# Patient Record
Sex: FEMALE | Race: WHITE | NOT HISPANIC OR LATINO | Employment: UNEMPLOYED | URBAN - NONMETROPOLITAN AREA
[De-identification: names, ages, dates, MRNs, and addresses within clinical notes are randomized per-mention and may not be internally consistent; named-entity substitution may affect disease eponyms.]

---

## 2019-01-20 ENCOUNTER — WALK IN (OUTPATIENT)
Dept: URGENT CARE | Age: 24
End: 2019-01-20

## 2019-01-20 VITALS
WEIGHT: 176 LBS | OXYGEN SATURATION: 100 % | HEART RATE: 96 BPM | BODY MASS INDEX: 31.18 KG/M2 | HEIGHT: 63 IN | RESPIRATION RATE: 16 BRPM | TEMPERATURE: 99.1 F | DIASTOLIC BLOOD PRESSURE: 84 MMHG | SYSTOLIC BLOOD PRESSURE: 120 MMHG

## 2019-01-20 DIAGNOSIS — R06.02 SOB (SHORTNESS OF BREATH): ICD-10-CM

## 2019-01-20 DIAGNOSIS — J06.9 VIRAL URI: Primary | ICD-10-CM

## 2019-01-20 LAB
% PREDICTED PEAK FLOW: NORMAL % (ref 80–150)
ALBUTEROL DOSE: NORMAL INHALATIONS
AZMACORT DOSE: NORMAL INHALATIONS
FLOVENT DOSE: NORMAL INHALATIONS
O2 SATURATION,RESTING: NORMAL % (ref 95–100)
PEAK EXPIRATORY FLOW: 380 L/MIN (ref 30–900)
PULMICORT DOSE: NORMAL INHALATIONS
SINGULAIR DOSE: NORMAL INHALATIONS

## 2019-01-20 PROCEDURE — 94640 AIRWAY INHALATION TREATMENT: CPT | Performed by: FAMILY MEDICINE

## 2019-01-20 PROCEDURE — 99202 OFFICE O/P NEW SF 15 MIN: CPT | Performed by: FAMILY MEDICINE

## 2019-01-20 RX ORDER — ALBUTEROL SULFATE 2.5 MG/3ML
2.5 SOLUTION RESPIRATORY (INHALATION) ONCE
Status: COMPLETED | OUTPATIENT
Start: 2019-01-20 | End: 2019-01-20

## 2019-01-20 RX ADMIN — ALBUTEROL SULFATE 2.5 MG: 2.5 SOLUTION RESPIRATORY (INHALATION) at 13:48

## 2019-01-20 ASSESSMENT — ENCOUNTER SYMPTOMS
WHEEZING: 0
DIZZINESS: 0
LIGHT-HEADEDNESS: 0
SINUS PRESSURE: 0
CHILLS: 0
TROUBLE SWALLOWING: 0
COUGH: 0
CHOKING: 0
SORE THROAT: 1
FEVER: 0
RHINORRHEA: 0
FATIGUE: 0
HEADACHES: 0
ABDOMINAL PAIN: 0
DIAPHORESIS: 0
CHEST TIGHTNESS: 0
SHORTNESS OF BREATH: 1
EYE REDNESS: 0
APPETITE CHANGE: 0
STRIDOR: 0
VOMITING: 0
EYE DISCHARGE: 0
DIARRHEA: 0

## 2019-02-25 ENCOUNTER — WALK IN (OUTPATIENT)
Dept: URGENT CARE | Age: 24
End: 2019-02-25

## 2019-02-25 VITALS
SYSTOLIC BLOOD PRESSURE: 134 MMHG | BODY MASS INDEX: 31.46 KG/M2 | DIASTOLIC BLOOD PRESSURE: 86 MMHG | RESPIRATION RATE: 18 BRPM | TEMPERATURE: 100.2 F | OXYGEN SATURATION: 97 % | HEART RATE: 106 BPM | WEIGHT: 177.6 LBS

## 2019-02-25 DIAGNOSIS — R05.9 COUGH: Primary | ICD-10-CM

## 2019-02-25 DIAGNOSIS — J10.1 INFLUENZA A: ICD-10-CM

## 2019-02-25 DIAGNOSIS — R50.9 FEVER AND CHILLS: ICD-10-CM

## 2019-02-25 DIAGNOSIS — J98.01 BRONCHOSPASM, ACUTE: ICD-10-CM

## 2019-02-25 LAB
FLUAV AG SPEC QL IA: POSITIVE
FLUBV AG SPEC QL IF: ABNORMAL

## 2019-02-25 PROCEDURE — 99214 OFFICE O/P EST MOD 30 MIN: CPT | Performed by: FAMILY MEDICINE

## 2019-02-25 PROCEDURE — 87804 INFLUENZA ASSAY W/OPTIC: CPT | Performed by: FAMILY MEDICINE

## 2019-02-25 RX ORDER — ALBUTEROL SULFATE 90 UG/1
2 AEROSOL, METERED RESPIRATORY (INHALATION) EVERY 4 HOURS PRN
Qty: 1 INHALER | Refills: 0 | Status: SHIPPED | OUTPATIENT
Start: 2019-02-25

## 2019-02-25 RX ORDER — OSELTAMIVIR PHOSPHATE 75 MG/1
75 CAPSULE ORAL 2 TIMES DAILY
Qty: 10 CAPSULE | Refills: 0 | Status: SHIPPED | OUTPATIENT
Start: 2019-02-25 | End: 2019-03-02

## 2019-02-25 RX ORDER — ACETAMINOPHEN 325 MG/1
650 TABLET ORAL ONCE
Status: COMPLETED | OUTPATIENT
Start: 2019-02-25 | End: 2019-02-25

## 2019-02-25 RX ADMIN — ACETAMINOPHEN 650 MG: 325 TABLET ORAL at 20:12

## 2019-02-26 ENCOUNTER — TELEPHONE (OUTPATIENT)
Dept: URGENT CARE | Age: 24
End: 2019-02-26

## 2019-02-26 RX ORDER — ONDANSETRON 4 MG/1
4 TABLET, ORALLY DISINTEGRATING ORAL EVERY 8 HOURS PRN
Qty: 10 TABLET | Refills: 0 | Status: SHIPPED | OUTPATIENT
Start: 2019-02-26 | End: 2019-03-08

## 2019-02-27 ENCOUNTER — TELEPHONE (OUTPATIENT)
Dept: URGENT CARE | Age: 24
End: 2019-02-27

## 2019-02-27 ENCOUNTER — WALK IN (OUTPATIENT)
Dept: URGENT CARE | Age: 24
End: 2019-02-27

## 2019-02-27 DIAGNOSIS — J10.1 INFLUENZA A: ICD-10-CM

## 2019-02-27 DIAGNOSIS — J45.41 MODERATE PERSISTENT REACTIVE AIRWAY DISEASE WITH WHEEZING WITH ACUTE EXACERBATION: Primary | ICD-10-CM

## 2019-02-27 PROCEDURE — 99213 OFFICE O/P EST LOW 20 MIN: CPT | Performed by: FAMILY MEDICINE

## 2019-02-27 RX ORDER — PREDNISONE 20 MG/1
20 TABLET ORAL DAILY
Qty: 5 TABLET | Refills: 0 | Status: SHIPPED | OUTPATIENT
Start: 2019-02-27

## 2019-02-27 ASSESSMENT — PAIN SCALES - GENERAL: PAINLEVEL: 0

## 2019-03-28 ENCOUNTER — TELEPHONE (OUTPATIENT)
Dept: SCHEDULING | Age: 24
End: 2019-03-28

## 2021-05-25 VITALS
OXYGEN SATURATION: 99 % | HEART RATE: 92 BPM | TEMPERATURE: 98.3 F | SYSTOLIC BLOOD PRESSURE: 108 MMHG | RESPIRATION RATE: 16 BRPM | DIASTOLIC BLOOD PRESSURE: 64 MMHG

## 2022-12-27 ENCOUNTER — APPOINTMENT (RX ONLY)
Dept: URBAN - METROPOLITAN AREA CLINIC 334 | Facility: CLINIC | Age: 27
Setting detail: DERMATOLOGY
End: 2022-12-27

## 2022-12-27 DIAGNOSIS — Z12.83 ENCOUNTER FOR SCREENING FOR MALIGNANT NEOPLASM OF SKIN: ICD-10-CM

## 2022-12-27 DIAGNOSIS — D22 MELANOCYTIC NEVI: ICD-10-CM

## 2022-12-27 DIAGNOSIS — L71.8 OTHER ROSACEA: ICD-10-CM | Status: INADEQUATELY CONTROLLED

## 2022-12-27 DIAGNOSIS — L81.4 OTHER MELANIN HYPERPIGMENTATION: ICD-10-CM

## 2022-12-27 DIAGNOSIS — D18.0 HEMANGIOMA: ICD-10-CM

## 2022-12-27 PROBLEM — D18.01 HEMANGIOMA OF SKIN AND SUBCUTANEOUS TISSUE: Status: ACTIVE | Noted: 2022-12-27

## 2022-12-27 PROBLEM — D48.5 NEOPLASM OF UNCERTAIN BEHAVIOR OF SKIN: Status: ACTIVE | Noted: 2022-12-27

## 2022-12-27 PROCEDURE — ? TREATMENT REGIMEN

## 2022-12-27 PROCEDURE — 11300 SHAVE SKIN LESION 0.5 CM/<: CPT

## 2022-12-27 PROCEDURE — ? SHAVE REMOVAL

## 2022-12-27 PROCEDURE — ? FULL BODY SKIN EXAM

## 2022-12-27 PROCEDURE — ? COUNSELING

## 2022-12-27 PROCEDURE — ? IN-HOUSE DISPENSING PHARMACY

## 2022-12-27 PROCEDURE — ? ADDITIONAL NOTES

## 2022-12-27 PROCEDURE — 99204 OFFICE O/P NEW MOD 45 MIN: CPT | Mod: 25

## 2022-12-27 ASSESSMENT — LOCATION DETAILED DESCRIPTION DERM
LOCATION DETAILED: PERIUMBILICAL SKIN
LOCATION DETAILED: UPPER STERNUM
LOCATION DETAILED: LEFT INFERIOR MEDIAL MALAR CHEEK
LOCATION DETAILED: RIGHT INFERIOR MEDIAL LOWER BACK

## 2022-12-27 ASSESSMENT — LOCATION SIMPLE DESCRIPTION DERM
LOCATION SIMPLE: LEFT CHEEK
LOCATION SIMPLE: RIGHT LOWER BACK
LOCATION SIMPLE: CHEST
LOCATION SIMPLE: ABDOMEN

## 2022-12-27 ASSESSMENT — LOCATION ZONE DERM
LOCATION ZONE: TRUNK
LOCATION ZONE: FACE

## 2022-12-27 NOTE — PROCEDURE: IN-HOUSE DISPENSING PHARMACY
Product 35 Units Dispensed: 0
Product 8 Unit Type: bottle(s)
Name Of Product 16: Dermatitis Topical Solution (Clobetasol) #16
Product 6 Refills: 2
Product 30 Unit Type: mg
Product 11 Price/Unit (In Dollars): 20
Name Of Product 9: Dermatitis Cream $9 (Clobetasol)
Product 14 Choe/Unit (In Dollars): 45
Product 6 Amount/Unit (Numbers Only): 1
Product 2 Price/Unit (In Dollars): 55
Product 16 Application Directions: mix entire bottle into a large jar of Cerave Cream OTC and apply to affected area bid
Detail Level: Zone
Product 4 Application Directions: apply to face and body at night
Product 2 Refills: 3
Send Charges To Patient Encounter: Yes
Name Of Product 5: Acne Gel $4 (adapalene/bpo)
Product 9 Application Directions: apply to affected area twice a day for two weeks on and two weeks off
Name Of Product 17: Xerosis Gel $17
Name Of Product 3: Acne Moisturizing Cream #2
Product 7 Application Directions: apply 2-3 drops to scalp 3 times per week
Name Of Product 15: Antifungal Shampoo $15
Product 5 Refills: 4
Product 10 Price/Unit (In Dollars): 35
Name Of Product 8: Antifungal Cream #8 (Econazole)
Product 12 Application Directions: apply to face nightly for two months then two months off
Product 17 Amount/Unit (Numbers Only): 120
Product 15 Application Directions: apply to scalp 3 x per week
Product 3 Application Directions: apply to face at night
Name Of Product 4: Acne Gel Combo #3 (BPO/Clinda)
Product 8 Application Directions: apply to affected area twice a day
Name Of Product 11: Antibacterial Ointment # 11
Product 4 Price/Unit (In Dollars): 40
Name Of Product 2: Acne Gel #1
Name Of Product 14: Rosacea Silicone Gel #14
Product 6 Application Directions: apply to affected area 3x per week
Name Of Product 7: #6 Alopecia  Topical Solution
Product 11 Application Directions: apply to affected area bid for 10 days
Product 14 Application Directions: apply to face daily
Name Of Product 12: Melasma Emulsion
Product 12 Price/Unit (In Dollars): 60
Name Of Product 10: Fungal Dermatitis Cream (hydro/Keto)
Product 15 Price/Unit (In Dollars): 30
Product 3 Price/Unit (In Dollars): 50
Name Of Product 1: Nail Fungal Solution
Product 17 Application Directions: apply to affected area daily
Name Of Product 13: Rosacea Cream (azeleic acid) #13
Product 17 Unit Type: ml
Name Of Product 6: AK gel #5
Product 1 Application Directions: apply to nails twice a day
Product 10 Application Directions: apply to affected area 3 times per week
Product 13 Application Directions: apply to affected bid

## 2022-12-27 NOTE — PROCEDURE: SHAVE REMOVAL
Medical Necessity Information: It is in your best interest to select a reason for this procedure from the list below. All of these items fulfill various CMS LCD requirements except the new and changing color options.
Medical Necessity Clause: This procedure was medically necessary because the lesion that was treated was:
Lab: 6
Lab Facility: 3
Detail Level: Detailed
Was A Bandage Applied: Yes
Size Of Lesion In Cm (Required): 0.3
X Size Of Lesion In Cm (Optional): 0
Depth Of Shave: dermis
Biopsy Method: 15 blade
Anesthesia Type: 1% lidocaine with epinephrine
Anesthesia Volume In Cc: 0.7
Hemostasis: Electrocautery
Wound Care: Petrolatum
Consent was obtained from the patient. The risks and benefits to therapy were discussed in detail. Specifically, the risks of infection, scarring, bleeding, prolonged wound healing, incomplete removal, allergy to anesthesia, nerve injury and recurrence were addressed. Prior to the procedure, the treatment site was clearly identified and confirmed by the patient.
Post-Care Instructions: I reviewed with the patient in detail post-care instructions. Patient is to keep the biopsy site dry overnight, and then apply Vaseline twice daily until healed.
Notification Instructions: Patient will be notified of pathology results. However, patient instructed to call the office if not contacted within 2 weeks.
Billing Type: Third-Party Bill
Bill For Surgical Tray: no

## 2022-12-27 NOTE — PROCEDURE: ADDITIONAL NOTES
Detail Level: Simple
Additional Notes: Treatment goal for this pt is a reduction in erythema and flushing.
Render Risk Assessment In Note?: no